# Patient Record
Sex: MALE | Race: WHITE | NOT HISPANIC OR LATINO | URBAN - METROPOLITAN AREA
[De-identification: names, ages, dates, MRNs, and addresses within clinical notes are randomized per-mention and may not be internally consistent; named-entity substitution may affect disease eponyms.]

---

## 2018-12-10 ENCOUNTER — OFFICE VISIT (OUTPATIENT)
Dept: ORTHOPEDICS | Facility: CLINIC | Age: 23
End: 2018-12-10
Payer: COMMERCIAL

## 2018-12-10 ENCOUNTER — ANCILLARY PROCEDURE (OUTPATIENT)
Dept: GENERAL RADIOLOGY | Facility: CLINIC | Age: 23
End: 2018-12-10
Attending: FAMILY MEDICINE
Payer: COMMERCIAL

## 2018-12-10 VITALS
BODY MASS INDEX: 24.34 KG/M2 | DIASTOLIC BLOOD PRESSURE: 78 MMHG | SYSTOLIC BLOOD PRESSURE: 118 MMHG | WEIGHT: 170 LBS | HEIGHT: 70 IN

## 2018-12-10 DIAGNOSIS — M25.571 ACUTE RIGHT ANKLE PAIN: Primary | ICD-10-CM

## 2018-12-10 ASSESSMENT — MIFFLIN-ST. JEOR: SCORE: 1782.36

## 2018-12-10 NOTE — PROGRESS NOTES
SPORTS & ORTHOPEDIC WALK-IN VISIT 12/10/2018    Primary Care Physician:      Right ankle pain after playing soccer today. Inverted ankle while playing about an hour ago. Pain mostly on the lateral ankle. He does have some pain in his foot as well. He is able bear weight, but has pain. Swelling, but no bruising yet. Pain with ROM. Has been using ice since.     Reason for visit:     What part of your body is injured / painful?  right ankle    What caused the injury /pain? Recreational/competitive sports injury - Soccer    How long ago did your injury occur or pain begin? today    What are your most bothersome symptoms? Pain and Swelling    How would you characterize your symptom?  sharp and throbing    What makes your symptoms better? Rest and Ice    What makes your symptoms worse? Standing, Walking and Movement    Have you been previously seen for this problem? No    Medical History:    Any recent changes to your medical history? No    Any new medication prescribed since last visit? No    Have you had surgery on this body part before? No    Social History:    Occupation: Student     Handedness: Right    Exercise: 4-5 days/week    Review of Systems:    Do you have fever, chills, weight loss? No    Do you have any vision problems? No    Do you have any chest pain or edema? No    Do you have any shortness of breath or wheezing?  No    Do you have stomach problems? No    Do you have any numbness or focal weakness? No    Do you have diabetes? No    Do you have problems with bleeding or clotting? No    Do you have an rashes or other skin lesions? No

## 2018-12-10 NOTE — PROGRESS NOTES
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 The patient reports that he was playing soccer in class today when he rolled his right ankle inwards and heard/felt a pop. He has been able to ambulate by limping but has significant pain with ankle range of motion. The pain is worse on the lateral aspect of the ankle and radiates up and into the foot. He has been icing but has developed significant swelling.     Review of Systems:   A 10-point review of systems was obtained and is negative except for as noted in the HPI.     Medications:     Current Outpatient Medications:      albuterol (2.5 MG/3ML) 0.083% nebulizer solution, Take 1 vial by nebulization every 6 hours as needed Inhaler- PRN, Disp: , Rfl:      budesonide (PULMICORT FLEXHALER) 180 MCG/ACT inhaler, Inhale into the lungs 2 times daily, Disp: , Rfl:      fluticasone-salmeterol (ADVAIR) 500-50 MCG/DOSE diskus inhaler, Inhale 1 puff into the lungs every 12 hours PRN, Disp: , Rfl:      loratadine (CLARITIN) 10 MG tablet, Take 10 mg by mouth daily, Disp: , Rfl:      terbinafine (LAMISIL) 1 % cream, Apply topically 2 times daily, Disp: , Rfl:     Allergies:  The patient reports no known allergies.     Past Medical History:  Asthma     Past Surgical History:  The patient does not have any pertinent past surgical history.    Social History:  Patient is a student at the [employer]. No tobacco use.     Family History:  No past pertinent family history.     Physical Examination:  Blood pressure 118/78, height 1.778 m (5' 10\"), weight 77.1 kg (170 lb).   General  - normal appearance, in no " obvious distress  HEENT  -Pupils equal, round, no conjunctival injection.  No lid lag  CV  - normal pulses at posterior tib and dorsalis pedis  Pulm  - normal respiratory pattern, non-labored  Musculoskeletal - right ankle  - stance: gait favors affected side, reluctant to bear weight  - inspection: moderate swelling at the lateral malleolus, no swelling at the medial malleolus and no swelling at the base of the 5th metatarsal, normal bone and joint alignment, no obvious deformity  - palpation: tenderness to palpation at the lateral malleolus,tenderness at CFL, tenderness at distal syndesmosis and appx 4 cm proximal to AITFL. no tenderness over medial malleoli, navicular, or base of 5th MT  - ROM: limited in all planes of motion secondary to pain   - strength: 4/5 in eversion, 5/5 in all other planes  - special tests:  pain with inversion stress  (-) squeeze test  (-) Prieto test  Neuro  - no sensory or motor deficit, grossly normal coordination, normal muscle tone  Skin  - ecchymosis at the tip of the lateral malleolus, no warmth or induration, no obvious rash  Psych  - interactive, appropriate, normal mood and affect    Imaging:   Right ankle. Final results and radiologist's interpretation, available in the [facility] health record. Images were reviewed with the patient/family members in the office today. My personal interpretation of the performed imaging is: Prominent soft tissue swelling overlying lateral malleolus. No acute osseous abnormality. Mortise of the ankle appears to be intact.     Assessment:   23 year old male with inversion injury to right ankle today    Diagnosis:  high right ankle sprain.     Plan:     Patient should remain non-weightbearing. A boot and crutches were provided.     He should ice and elevate and can use ibuprofen or naproxen for pain and swelling.     Follow up in 2 weeks for recheck. May transition to WB of syndesmosis tenderness has resolved.    It was a pleasure seeing Kevan  today.    Sahil Case DO, [facility]  Primary Care Sports Medicine    I, Sahil Case DO, have reviewed the above note and agree with the scribe's notation as written.    Scribe Disclosure:   I, Heather Marquez, am serving as a scribe to document services personally performed by Sahil Case DO at this visit, based upon the provider's statements to me. All documentation has been reviewed by the aforementioned provider prior to being entered into the official medical record.

## 2018-12-10 NOTE — LETTER
12/10/2018       RE: Kevan Bermeo  [address]  [place] [address]     Dear Colleague,    Thank you for referring your patient, Kevan Bermeo, to the [facility] SPORTS AND ORTHOPAEDIC WALK IN CLINIC at [facility]. Please see a copy of my visit note below.          SPORTS & ORTHOPEDIC WALK-IN VISIT 12/10/2018    Primary Care Physician:      Right ankle pain after playing soccer today. Inverted ankle while playing about an hour ago. Pain mostly on the lateral ankle. He does have some pain in his foot as well. He is able bear weight, but has pain. Swelling, but no bruising yet. Pain with ROM. Has been using ice since.     Reason for visit:     What part of your body is injured / painful?  right ankle    What caused the injury /pain? Recreational/competitive sports injury - Soccer    How long ago did your injury occur or pain begin? today    What are your most bothersome symptoms? Pain and Swelling    How would you characterize your symptom?  sharp and throbing    What makes your symptoms better? Rest and Ice    What makes your symptoms worse? Standing, Walking and Movement    Have you been previously seen for this problem? No    Medical History:    Any recent changes to your medical history? No    Any new medication prescribed since last visit? No    Have you had surgery on this body part before? No    Social History:    Occupation: Student     Handedness: Right    Exercise: 4-5 days/week    Review of Systems:    Do you have fever, chills, weight loss? No    Do you have any vision problems? No    Do you have any chest pain or edema? No    Do you have any shortness of breath or wheezing?  No    Do you have stomach problems? No    Do you have any numbness or focal weakness? No    Do you have diabetes? No    Do you have problems with bleeding or clotting? No    Do you have an rashes or other skin lesions? 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 The patient reports that he was playing soccer in class today when he rolled his right ankle inwards and heard/felt a pop. He has been able to ambulate by limping but has significant pain with ankle range of motion. The pain is worse on the lateral aspect of the ankle and radiates up and into the foot. He has been icing but has developed significant swelling.     Review of Systems:   A 10-point review of systems was obtained and is negative except for as noted in the HPI.     Medications:     Current Outpatient Medications:      albuterol (2.5 MG/3ML) 0.083% nebulizer solution, Take 1 vial by nebulization every 6 hours as needed Inhaler- PRN, Disp: , Rfl:      budesonide (PULMICORT FLEXHALER) 180 MCG/ACT inhaler, Inhale into the lungs 2 times daily, Disp: , Rfl:      fluticasone-salmeterol (ADVAIR) 500-50 MCG/DOSE diskus inhaler, Inhale 1 puff into the lungs every 12 hours PRN, Disp: , Rfl:      loratadine (CLARITIN) 10 MG tablet, Take 10 mg by mouth daily, Disp: , Rfl:      terbinafine (LAMISIL) 1 % cream, Apply topically 2 times daily, Disp: , Rfl:     Allergies:  The patient reports no known allergies.     Past Medical History:  Asthma     Past Surgical History:  The patient does not have any pertinent past surgical history.    Social History:  Patient is a student at the [employer]. No tobacco use.     Family History:  No past pertinent family history.     Physical Examination:  Blood pressure 118/78, height 1.778 m (5' 10\"), weight 77.1 kg (170 lb).   General  - normal appearance, in no obvious distress  HEENT  -Pupils equal, round, no " conjunctival injection.  No lid lag  CV  - normal pulses at posterior tib and dorsalis pedis  Pulm  - normal respiratory pattern, non-labored  Musculoskeletal - right ankle  - stance: gait favors affected side, reluctant to bear weight  - inspection: moderate swelling at the lateral malleolus, no swelling at the medial malleolus and no swelling at the base of the 5th metatarsal, normal bone and joint alignment, no obvious deformity  - palpation: tenderness to palpation at the lateral malleolus,tenderness at CFL, tenderness at distal syndesmosis and appx 4 cm proximal to AITFL. no tenderness over medial malleoli, navicular, or base of 5th MT  - ROM: limited in all planes of motion secondary to pain   - strength: 4/5 in eversion, 5/5 in all other planes  - special tests:  pain with inversion stress  (-) squeeze test  (-) Prieto test  Neuro  - no sensory or motor deficit, grossly normal coordination, normal muscle tone  Skin  - ecchymosis at the tip of the lateral malleolus, no warmth or induration, no obvious rash  Psych  - interactive, appropriate, normal mood and affect    Imaging:   Right ankle. Final results and radiologist's interpretation, available in the [facility] health record. Images were reviewed with the patient/family members in the office today. My personal interpretation of the performed imaging is: Prominent soft tissue swelling overlying lateral malleolus. No acute osseous abnormality. Mortise of the ankle appears to be intact.     Assessment:   23 year old male with inversion injury to right ankle today    Diagnosis:  high right ankle sprain.     Plan:     Patient should remain non-weightbearing. A boot and crutches were provided.     He should ice and elevate and can use ibuprofen or naproxen for pain and swelling.     Follow up in 2 weeks for recheck. May transition to WB of syndesmosis tenderness has resolved.    It was a pleasure seeing Kevan today.    Sahil Case, DO, [facility]  Primary Care Sports  Medicine    I, Sahil Case DO, have reviewed the above note and agree with the scribe's notation as written.    Scribe Disclosure:   I, Heather Marquez, am serving as a scribe to document services personally performed by Sahil Case DO at this visit, based upon the provider's statements to me. All documentation has been reviewed by the aforementioned provider prior to being entered into the official medical record.

## 2023-11-30 NOTE — PROGRESS NOTES
ASSESSMENT AND PLAN:       #Allergies  - Well-controlled with q1mo allergy shot    #Severe peanut/treenut allergy (anaphylaxis)  - Most recent hospitalization: 6/10/22  - Has Epipen    # Asthma  - Childhood dx  - Seasonal exacerbations (SOB and wheezing): winter to spring and summer to fall  - Well-managed with Advair qday and Ventolin rescue: refills sent for both  - Continue to follow    #AC Joint separation, Left  - 6/2023 Mountain biking and fgell over handle bars  AC joint  - 6/6/23 XR Shoulder Lt 2views   Impression:  Bony structures appear intact. There is widening of the acromioclavicular joint with slight elevation of the  distal clavicle in relation to the acromion. AC separation is suspected.   No bony fractures. The glenohumeral joint and subacromial space appear   maintained. There is no dislocation or other acute finding.   - Still some pain with exertion, but improving  - Continue with PT exercise    Fransico Heller, MS3      I was present with the medical student who participated in the service and in the documentation of the note. I have verified the history and personally performed the physical exam and medical decision making. I agree with the assessment and plan of care as documented in the note with the following additions:     (J45.40) Moderate persistent asthma without complication  (primary encounter diagnosis)  Comment: Chronic, stable.     Follows with allergist.  Refilled current regimen. Advised once seasonal worsening of symptoms has calmed down that Kevan attempt to decrease Flovent dose to 1 puff twice a day to minimize steroid exposure.     Plan: fluticasone-salmeterol (ADVAIR HFA) 115-21         MCG/ACT inhaler, albuterol (PROAIR         HFA/PROVENTIL HFA/VENTOLIN HFA) 108 (90 Base)         MCG/ACT inhaler          (Z23) Need for diphtheria-tetanus-pertussis (Tdap) vaccine  Comment: Plan: TDAP VACCINE (Adacel, Boostrix)  [[id number]]          (Z13.220) Screening cholesterol  "level  Comment: Plan: Lipid panel reflex to direct LDL Fasting          (Z13.1) Screening for diabetes mellitus  Comment: Plan: Glucose          (Z11.4) Screening for HIV (human immunodeficiency virus)  Comment: Plan: HIV Antigen Antibody Combo          (Z11.59) Encounter for hepatitis C screening test for low risk patient  Comment: Plan: Hepatitis C Screen Reflex to HCV RNA Quant and         Genotype          Elmer Prieto MD  1:27 PM, 2023        SUBJECTIVE:   Kevan is a 28 year old male who presents to clinic today to establish care and to discuss the following problem(s).    #Allergies  - allergy shots - few yrs, q1mo @ [facility] ([place], [place])     # Asthma - Childhood dx  - Exacerbations Requiring Steroids in Past Year: none   - seasons more around seasonal changes (winter to spring, summer to fall)  - Hospitalizations/Intubations?: none  - Identified Triggers: seasonal changes, allergies, sometimes respiratory illnesses  - Controller Medications: Advair -21mcg 2 puffs twice a day  - Rescue Inhaler Use Per Week: seasonal - \"couple\"/wk  - Medication Side Effects: none  - in office ACT: 23  - exacerbations: dyspnea, wheezing    #AC Joint separation, Left  - Mountain biking and fgell over handle bars  AC joint  - 23 XR Shoulder Lt 2views   Impression:  Bony structures appear intact. There is widening of the acromioclavicular joint with slight elevation of the  distal clavicle in relation to the acromion. AC separation is suspected.   No bony fractures. The glenohumeral joint and subacromial space appear   maintained. There is no dislocation or other acute finding.   - Compliant with PT  - Still some pain with exertion, but improving    #Healthy Habits  - Physical activity: soccer, volleyball, rock climbinx/wk 1-1.5hrs  - Cooking a lot at home, aims for low carb  - Sexual: 8yrs monogamous with gf, no concerns for STIs at this time    #Immunizations  - " TDAP last 2009: open to receiving today  - flu and covid completed at [facility] in past month  - HPV: discussed low risk for HPV    Past Medical History:   Diagnosis Date    Allergic reaction     cat (congestion), dog (congestion), peanut/treenut (anaphylaxis)    Closed fracture of metacarpal bone 2012    right foot 2/2 snowboarding    Closed fracture of one or more phalanges of foot 2012    big toe 2/2 snowboarding    Concussion without loss of consciousness 2013    Pathological fracture of forearm 2013    R forearm 2/2 snowboarding    Pathological fracture of tibia 2012    2/2 snowboarding    Uncomplicated asthma      Past Surgical History:   Procedure Laterality Date    DENTAL SURGERY Bilateral 2015    [place] teeth extraction     Family History   Problem Relation Age of Onset    No Known Problems Mother     No Known Problems Father     No Known Problems Sister     Asthma Brother 5    No Known Problems Maternal Grandmother     No Known Problems Maternal Grandfather     No Known Problems Paternal Grandmother     No Known Problems Paternal Grandfather     Melanoma No family hx of     Skin Cancer No family hx of     Prostate Cancer No family hx of     Diabetes No family hx of     Hypertension No family hx of     Hyperlipidemia No family hx of     Cerebrovascular Disease No family hx of     Coronary Artery Disease No family hx of      Social History     Tobacco Use    Smoking status: Never    Smokeless tobacco: Never   Vaping Use    Vaping Use: Never used   Substance Use Topics    Alcohol use: Yes     Alcohol/week: 5.0 standard drinks of alcohol     Types: 5 Standard drinks or equivalent per week     Comment: 5 drinks a week; 2x drinking/wk    Drug use: Never     Social History     Social History Narrative    Lives in [place] with girlfriend - 8yrs    Monogamous    Feels safe at home     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     I have reviewed the patient's past medical, surgical, family, and social history.       OBJECTIVE:   /80   Pulse 94   Temp 97  F (36.1  C)   Ht 1.763 m (5' 9.41\")   Wt 91.2 kg (201 lb)   SpO2 92%   BMI 29.33 kg/m      Constitutional: well-appearing, appears stated age  Eyes: conjunctivae without erythema, sclera anicteric.   Cardiac: regular rate and rhythm, normal S1/S2, no murmur/rubs/gallops  Respiratory: lungs clear to auscultation bilaterally, normal work of breathing, no wheezes/crackles  Skin: no rashes, lesions, or wounds  Psych: affect is full and appropriate, speech is fluent and non-pressured  "

## 2023-12-01 ENCOUNTER — OFFICE VISIT (OUTPATIENT)
Dept: FAMILY MEDICINE | Facility: CLINIC | Age: 28
End: 2023-12-01
Payer: COMMERCIAL

## 2023-12-01 VITALS
DIASTOLIC BLOOD PRESSURE: 80 MMHG | SYSTOLIC BLOOD PRESSURE: 135 MMHG | BODY MASS INDEX: 29.77 KG/M2 | WEIGHT: 201 LBS | HEIGHT: 69 IN | OXYGEN SATURATION: 92 % | TEMPERATURE: 97 F | HEART RATE: 94 BPM

## 2023-12-01 DIAGNOSIS — Z23 NEED FOR DIPHTHERIA-TETANUS-PERTUSSIS (TDAP) VACCINE: ICD-10-CM

## 2023-12-01 DIAGNOSIS — Z11.4 SCREENING FOR HIV (HUMAN IMMUNODEFICIENCY VIRUS): ICD-10-CM

## 2023-12-01 DIAGNOSIS — Z11.59 ENCOUNTER FOR HEPATITIS C SCREENING TEST FOR LOW RISK PATIENT: ICD-10-CM

## 2023-12-01 DIAGNOSIS — Z13.1 SCREENING FOR DIABETES MELLITUS: ICD-10-CM

## 2023-12-01 DIAGNOSIS — Z13.220 SCREENING CHOLESTEROL LEVEL: ICD-10-CM

## 2023-12-01 DIAGNOSIS — J45.40 MODERATE PERSISTENT ASTHMA WITHOUT COMPLICATION: Primary | ICD-10-CM

## 2023-12-01 LAB
CHOLEST SERPL-MCNC: 215 MG/DL
FASTING STATUS PATIENT QL REPORTED: NORMAL
GLUCOSE SERPL-MCNC: 91 MG/DL (ref 70–99)
HCV AB SERPL QL IA: NONREACTIVE
HDLC SERPL-MCNC: 50 MG/DL
HIV 1+2 AB+HIV1 P24 AG SERPL QL IA: NONREACTIVE
LDLC SERPL CALC-MCNC: 146 MG/DL
NONHDLC SERPL-MCNC: 165 MG/DL
TRIGL SERPL-MCNC: 97 MG/DL

## 2023-12-01 PROCEDURE — 80061 LIPID PANEL: CPT | Performed by: FAMILY MEDICINE

## 2023-12-01 PROCEDURE — 82947 ASSAY GLUCOSE BLOOD QUANT: CPT | Performed by: FAMILY MEDICINE

## 2023-12-01 PROCEDURE — 86803 HEPATITIS C AB TEST: CPT | Performed by: FAMILY MEDICINE

## 2023-12-01 PROCEDURE — 87389 HIV-1 AG W/HIV-1&-2 AB AG IA: CPT | Performed by: FAMILY MEDICINE

## 2023-12-01 RX ORDER — EPINEPHRINE 0.3 MG/.3ML
0.3 INJECTION SUBCUTANEOUS PRN
COMMUNITY

## 2023-12-01 RX ORDER — ALBUTEROL SULFATE 0.83 MG/ML
2.5 SOLUTION RESPIRATORY (INHALATION) EVERY 6 HOURS PRN
Qty: 90 ML | Refills: 3 | Status: CANCELLED | OUTPATIENT
Start: 2023-12-01

## 2023-12-01 RX ORDER — FLUTICASONE PROPIONATE AND SALMETEROL 500; 50 UG/1; UG/1
1 POWDER RESPIRATORY (INHALATION)
Status: CANCELLED | OUTPATIENT
Start: 2023-12-01

## 2023-12-01 RX ORDER — FLUTICASONE PROPIONATE AND SALMETEROL XINAFOATE 115; 21 UG/1; UG/1
2 AEROSOL, METERED RESPIRATORY (INHALATION) 2 TIMES DAILY
COMMUNITY
End: 2023-12-01

## 2023-12-01 RX ORDER — FLUTICASONE PROPIONATE AND SALMETEROL XINAFOATE 115; 21 UG/1; UG/1
2 AEROSOL, METERED RESPIRATORY (INHALATION) 2 TIMES DAILY
Qty: 36 G | Refills: 3 | Status: SHIPPED | OUTPATIENT
Start: 2023-12-01

## 2023-12-01 RX ORDER — ALBUTEROL SULFATE 90 UG/1
2 AEROSOL, METERED RESPIRATORY (INHALATION) EVERY 4 HOURS PRN
Qty: 36 G | Refills: 11 | Status: SHIPPED | OUTPATIENT
Start: 2023-12-01

## 2023-12-01 ASSESSMENT — ASTHMA QUESTIONNAIRES: ACT_TOTALSCORE: 23

## 2023-12-01 NOTE — NURSING NOTE
Prior to immunization administration, verified patients identity using patient s name and date of birth. Please see Immunization Activity for additional information.     Screening Questionnaire for Adult Immunization    Are you sick today?   No   Do you have allergies to medications, food, a vaccine component or latex?   No   Have you ever had a serious reaction after receiving a vaccination?   No   Do you have a long-term health problem with heart, lung, kidney, or metabolic disease (e.g., diabetes), asthma, a blood disorder, no spleen, complement component deficiency, a cochlear implant, or a spinal fluid leak?  Are you on long-term aspirin therapy?   No   Do you have cancer, leukemia, HIV/AIDS, or any other immune system problem?   No   Do you have a parent, brother, or sister with an immune system problem?   No   In the past 3 months, have you taken medications that affect  your immune system, such as prednisone, other steroids, or anticancer drugs; drugs for the treatment of rheumatoid arthritis, Crohn s disease, or psoriasis; or have you had radiation treatments?   No   Have you had a seizure, or a brain or other nervous system problem?   No   During the past year, have you received a transfusion of blood or blood    products, or been given immune (gamma) globulin or antiviral drug?   No   For women: Are you pregnant or is there a chance you could become       pregnant during the next month?   No   Have you received any vaccinations in the past 4 weeks?   No     Immunization questionnaire answers were all negative.      Patient instructed to remain in clinic for 15 minutes afterwards, and to report any adverse reactions.     Screening performed by Pamela Narayanan MA on 12/1/2023 at 10:51 AM.

## 2023-12-01 NOTE — LETTER
My Asthma Action Plan    Name: Kevan Bermeo   YOB: 1995  Date: 12/1/2023   My doctor: Elmer Prieto MD   My clinic: [facility]        My Control Medicine: Fluticasone propionate + salmeterol (Advair HFA) -  115/21 mcg 2 puffs twice a day  My Rescue Medicine: Albuterol (Proair/Ventolin/Proventil HFA) 2-4 puffs EVERY 4 HOURS as needed. Use a spacer if recommended by your provider.   My Asthma Severity:   Moderate Persistent  Know your asthma triggers: upper respiratory infections, pollens, and season changes               GREEN ZONE   Good Control  I feel good  No cough or wheeze  Can work, sleep and play without asthma symptoms       Take your asthma control medicine every day.     If exercise triggers your asthma, take your rescue medication  15 minutes before exercise or sports, and  During exercise if you have asthma symptoms  Spacer to use with inhaler: If you have a spacer, make sure to use it with your inhaler             YELLOW ZONE Getting Worse  I have ANY of these:  I do not feel good  Cough or wheeze  Chest feels tight  Wake up at night   Keep taking your Green Zone medications  Start taking your rescue medicine:  every 20 minutes for up to 1 hour. Then every 4 hours for 24-48 hours.  If you stay in the Yellow Zone for more than 12-24 hours, contact your doctor.  If you do not return to the Green Zone in 12-24 hours or you get worse, start taking your oral steroid medicine if prescribed by your provider.           RED ZONE Medical Alert - Get Help  I have ANY of these:  I feel awful  Medicine is not helping  Breathing getting harder  Trouble walking or talking  Nose opens wide to breathe       Take your rescue medicine NOW  If your provider has prescribed an oral steroid medicine, start taking it NOW  Call your doctor NOW  If you are still in the Red Zone after 20 minutes and you have not reached your doctor:  Take your rescue medicine again and  Call 911 or go to  the emergency room right away    See your regular doctor within 2 weeks of an Emergency Room or Urgent Care visit for follow-up treatment.          Annual Reminders:  Meet with Asthma Educator,  Flu Shot in the Fall, consider Pneumonia Vaccination for patients with asthma (aged 19 and older).    Pharmacy:    [id number] - [place], [place] - [address] [id number] - [place], [place] - [address] AT [facility] [address]    Electronically signed by Elmer Prieto MD   Date: 12/01/23                      Asthma Triggers  How To Control Things That Make Your Asthma Worse    Triggers are things that make your asthma worse.  Look at the list below to help you find your triggers and what you can do about them.  You can help prevent asthma flare-ups by staying away from your triggers.      Trigger                                                          What you can do   Cigarette Smoke  Tobacco smoke can make asthma worse. Do not allow smoking in your home, car or around you.  Be sure no one smokes at a child s day care or school.  If you smoke, ask your health care provider for ways to help you quit.  Ask family members to quit too.  Ask your health care provider for a referral to Quit Plan to help you quit smoking, or call 8-190-253-PLAN.     Colds, Flu, Bronchitis  These are common triggers of asthma. Wash your hands often.  Don t touch your eyes, nose or mouth.  Get a flu shot every year.     Dust Mites  These are tiny bugs that live in cloth or carpet. They are too small to see. Wash sheets and blankets in hot water every week.   Encase pillows and mattress in dust mite proof covers.  Avoid having carpet if you can. If you have carpet, vacuum weekly.   Use a dust mask and HEPA vacuum.   Pollen and Outdoor Mold  Some people are allergic to trees, grass, or weed pollen, or molds. Try to keep your windows closed.  Limit time out doors when pollen count is high.   Ask you  health care provider about taking medicine during allergy season.     Animal Dander  Some people are allergic to skin flakes, urine or saliva from pets with fur or feathers. Keep pets with fur or feathers out of your home.    If you can t keep the pet outdoors, then keep the pet out of your bedroom.  Keep the bedroom door closed.  Keep pets off cloth furniture and away from stuffed toys.     Mice, Rats, and Cockroaches   Some people are allergic to the waste from these pests.   Cover food and garbage.  Clean up spills and food crumbs.  Store grease in the refrigerator.   Keep food out of the bedroom.   Indoor Mold  This can be a trigger if your home has high moisture. Fix leaking faucets, pipes, or other sources of water.   Clean moldy surfaces.  Dehumidify basement if it is damp and smelly.   Smoke, Strong Odors, and Sprays  These can reduce air quality. Stay away from strong odors and sprays, such as perfume, powder, hair spray, paints, smoke incense, paint, cleaning products, candles and new carpet.   Exercise or Sports  Some people with asthma have this trigger. Be active!  Ask your doctor about taking medicine before sports or exercise to prevent symptoms.    Warm up for 5-10 minutes before and after sports or exercise.     Other Triggers of Asthma  Cold air:  Cover your nose and mouth with a scarf.  Sometimes laughing or crying can be a trigger.  Some medicines and food can trigger asthma.

## 2023-12-01 NOTE — LETTER
December 4, 2023      Kevan Bermeo  [address]  [place] [facility] [address]        Hi Kevan,     Your blood sugar (glucose) is normal.     The screening tests for HIV and hepatitis C were negative.     Your cholesterol ended up coming back a little high. The good news is that your HDL level (your good cholesterol level) is in a good place. The less good news is that your LDL (bad cholesterol) is slightly high at 146.     You definitely do not need to be on a medication at 28 with an LDL of 146. You should take this seriously, though. You're already very physically active - please continue this forever! We didn't go really in depth with your diet at your visit.     The American Heart Association has good dietary advice here:   https://www.heart.org/en/health-topics/cholesterol/prevention-and-treatment-of-high-cholesterol-hyperlipidemia/cooking-to-lower-cholesterol     If you would like more personalized and comprehensive advice on healthy changes you could make (as well as advice on how to fit those changes into your busy life), we have a wonderful dietician here at [place] named Desiree Estes you could see.     Please let me know if you have any questions. I would repeat your cholesterol in a year. If it is the same, we can space out the testing to every 3-5 years.     Simba Prieto MD on 12/3/2023 at 4:41 PM     Resulted Orders   Lipid panel reflex to direct LDL Fasting   Result Value Ref Range    Cholesterol 215 (H) <200 mg/dL    Triglycerides 97 <150 mg/dL    Direct Measure HDL 50 >=40 mg/dL    LDL Cholesterol Calculated 146 (H) <=100 mg/dL    Non HDL Cholesterol 165 (H) <130 mg/dL    Narrative    Cholesterol  Desirable:  <200 mg/dL    Triglycerides  Normal:  Less than 150 mg/dL  Borderline High:  150-199 mg/dL  High:  200-499 mg/dL  Very High:  Greater than or equal to 500 mg/dL    Direct Measure HDL  Female:  Greater than or equal to 50 mg/dL   Male:  Greater than or equal to 40  mg/dL    LDL Cholesterol  Desirable:  <100mg/dL  Above Desirable:  100-129 mg/dL   Borderline High:  130-159 mg/dL   High:  160-189 mg/dL   Very High:  >= 190 mg/dL    Non HDL Cholesterol  Desirable:  130 mg/dL  Above Desirable:  130-159 mg/dL  Borderline High:  160-189 mg/dL  High:  190-219 mg/dL  Very High:  Greater than or equal to 220 mg/dL   Glucose   Result Value Ref Range    Glucose 91 70 - 99 mg/dL    Patient Fasting > 8hrs? Unknown    HIV Antigen Antibody Combo   Result Value Ref Range    HIV Antigen Antibody Combo Nonreactive Nonreactive      Comment:      HIV-1 p24 Ag & HIV-1/HIV-2 Ab Not Detected   Hepatitis C Screen Reflex to HCV RNA Quant and Genotype   Result Value Ref Range    Hepatitis C Antibody Nonreactive Nonreactive    Narrative    Assay performance characteristics have not been established for newborns, infants, and children.

## 2023-12-10 ENCOUNTER — HEALTH MAINTENANCE LETTER (OUTPATIENT)
Age: 28
End: 2023-12-10

## 2025-01-11 ENCOUNTER — HEALTH MAINTENANCE LETTER (OUTPATIENT)
Age: 30
End: 2025-01-11